# Patient Record
Sex: MALE | Race: OTHER | NOT HISPANIC OR LATINO | URBAN - METROPOLITAN AREA
[De-identification: names, ages, dates, MRNs, and addresses within clinical notes are randomized per-mention and may not be internally consistent; named-entity substitution may affect disease eponyms.]

---

## 2024-03-22 ENCOUNTER — EMERGENCY (EMERGENCY)
Facility: HOSPITAL | Age: 19
LOS: 1 days | Discharge: HOME | End: 2024-03-22
Attending: EMERGENCY MEDICINE
Payer: COMMERCIAL

## 2024-03-22 VITALS
HEART RATE: 60 BPM | TEMPERATURE: 98 F | SYSTOLIC BLOOD PRESSURE: 130 MMHG | RESPIRATION RATE: 16 BRPM | OXYGEN SATURATION: 99 % | DIASTOLIC BLOOD PRESSURE: 80 MMHG

## 2024-03-22 VITALS
HEART RATE: 74 BPM | HEIGHT: 74 IN | SYSTOLIC BLOOD PRESSURE: 160 MMHG | DIASTOLIC BLOOD PRESSURE: 67 MMHG | WEIGHT: 195.11 LBS | RESPIRATION RATE: 18 BRPM | TEMPERATURE: 98 F | OXYGEN SATURATION: 99 %

## 2024-03-22 PROCEDURE — 99283 EMERGENCY DEPT VISIT LOW MDM: CPT | Mod: 25

## 2024-03-22 PROCEDURE — 99284 EMERGENCY DEPT VISIT MOD MDM: CPT

## 2024-03-22 PROCEDURE — 71046 X-RAY EXAM CHEST 2 VIEWS: CPT | Mod: 26

## 2024-03-22 PROCEDURE — 71046 X-RAY EXAM CHEST 2 VIEWS: CPT

## 2024-03-22 PROCEDURE — 93005 ELECTROCARDIOGRAM TRACING: CPT

## 2024-03-22 RX ORDER — IBUPROFEN 200 MG
600 TABLET ORAL ONCE
Refills: 0 | Status: COMPLETED | OUTPATIENT
Start: 2024-03-22 | End: 2024-03-22

## 2024-03-22 NOTE — ED PROVIDER NOTE - PATIENT PORTAL LINK FT
You can access the FollowMyHealth Patient Portal offered by [facility] by registering at the following website: [email or web address]. By joining [facility]’s FollowMyHealth portal, you will also be able to view your health information using other applications (apps) compatible with our system.

## 2024-03-22 NOTE — ED PROVIDER NOTE - OBJECTIVE STATEMENT
18-year-old male with no past medical history presents with reports that last week he had cough, fever and chills.  The last 2 days he has been having difficulty breathing, right-sided chest pain and the feeling as if he needs to clear his throat.  Patient denies fever, cough, sore throat, ear pain at this time.  Patient is worried that there may be a problem with his lungs.

## 2024-03-22 NOTE — ED ADULT NURSE NOTE - HIV OFFER
Problem: Patient/Family Goals  Goal: Patient/Family Long Term Goal  Patient's Long Term Goal: To go home    Interventions:  - Nausea and vomiting under control.  - Blood work and electrolytes normalized.   - See additional Care Plan goals for specific inter preferences  - Enhance eating environment   Outcome: Progressing  Clear liquid diet. Opt out

## 2024-03-22 NOTE — ED PROVIDER NOTE - NSFOLLOWUPINSTRUCTIONS_ED_ALL_ED_FT
Follow up with your primary care doctor within 1 week     You can take motrin/tylenol as needed for pain.    If you experience any new or worsening symptoms or if you are concerned you can always come back to the emergency for a re-evaluation.

## 2024-03-22 NOTE — ED PROVIDER NOTE - CARDIAC, MLM
Normal rate, regular rhythm.  Heart sounds S1, S2.  No murmurs, rubs or gallops. +reproducible chest pain with palpation

## 2024-03-22 NOTE — ED PROVIDER NOTE - ATTENDING APP SHARED VISIT CONTRIBUTION OF CARE
18-year-old male with no past medical history presents with reports that last week he had cough, fever and chills.  The last 2 days he has been having difficulty breathing, right-sided chest pain and the feeling as if he needs to clear his throat.  Patient denies fever, cough, sore throat, ear pain at this time.  Patient is worried that there may be a problem with his lungs.    Chest pain is reproducible on exam. Consistent with costochondritis. No concerns for ACS. Lung sounds clear. patient requesting CXR. Will obtain.

## 2024-09-14 ENCOUNTER — EMERGENCY (EMERGENCY)
Facility: HOSPITAL | Age: 19
LOS: 1 days | Discharge: HOME | End: 2024-09-14
Attending: STUDENT IN AN ORGANIZED HEALTH CARE EDUCATION/TRAINING PROGRAM
Payer: COMMERCIAL

## 2024-09-14 VITALS
SYSTOLIC BLOOD PRESSURE: 116 MMHG | HEART RATE: 100 BPM | TEMPERATURE: 98 F | RESPIRATION RATE: 18 BRPM | DIASTOLIC BLOOD PRESSURE: 75 MMHG | OXYGEN SATURATION: 100 % | HEIGHT: 74 IN | WEIGHT: 216.05 LBS

## 2024-09-14 LAB
ANION GAP SERPL CALC-SCNC: 7 MMOL/L — SIGNIFICANT CHANGE UP (ref 5–17)
BUN SERPL-MCNC: 17 MG/DL — SIGNIFICANT CHANGE UP (ref 7–18)
CALCIUM SERPL-MCNC: 9.1 MG/DL — SIGNIFICANT CHANGE UP (ref 8.4–10.5)
CHLORIDE SERPL-SCNC: 108 MMOL/L — SIGNIFICANT CHANGE UP (ref 96–108)
CO2 SERPL-SCNC: 22 MMOL/L — SIGNIFICANT CHANGE UP (ref 22–31)
CREAT SERPL-MCNC: 1.23 MG/DL — SIGNIFICANT CHANGE UP (ref 0.5–1.3)
EGFR: 87 ML/MIN/1.73M2 — SIGNIFICANT CHANGE UP
GLUCOSE SERPL-MCNC: 127 MG/DL — HIGH (ref 70–99)
HCT VFR BLD CALC: 42.2 % — SIGNIFICANT CHANGE UP (ref 39–50)
HGB BLD-MCNC: 15.7 G/DL — SIGNIFICANT CHANGE UP (ref 13–17)
MCHC RBC-ENTMCNC: 30.7 PG — SIGNIFICANT CHANGE UP (ref 27–34)
MCHC RBC-ENTMCNC: 36.9 GM/DL — HIGH (ref 32–36)
MCV RBC AUTO: 83.3 FL — SIGNIFICANT CHANGE UP (ref 80–100)
NRBC # BLD: 0 /100 WBCS — SIGNIFICANT CHANGE UP (ref 0–0)
PLATELET # BLD AUTO: 280 K/UL — SIGNIFICANT CHANGE UP (ref 150–400)
POTASSIUM SERPL-MCNC: 3.5 MMOL/L — SIGNIFICANT CHANGE UP (ref 3.5–5.3)
POTASSIUM SERPL-SCNC: 3.5 MMOL/L — SIGNIFICANT CHANGE UP (ref 3.5–5.3)
RBC # BLD: 3.48 M/UL — LOW (ref 4.2–5.8)
RBC # FLD: 11.7 % — SIGNIFICANT CHANGE UP (ref 10.3–14.5)
SODIUM SERPL-SCNC: 137 MMOL/L — SIGNIFICANT CHANGE UP (ref 135–145)
WBC # BLD: 6.25 K/UL — SIGNIFICANT CHANGE UP (ref 3.8–10.5)
WBC # FLD AUTO: 6.25 K/UL — SIGNIFICANT CHANGE UP (ref 3.8–10.5)

## 2024-09-14 PROCEDURE — 99285 EMERGENCY DEPT VISIT HI MDM: CPT | Mod: 25

## 2024-09-14 PROCEDURE — 36415 COLL VENOUS BLD VENIPUNCTURE: CPT

## 2024-09-14 PROCEDURE — 93005 ELECTROCARDIOGRAM TRACING: CPT

## 2024-09-14 PROCEDURE — 99284 EMERGENCY DEPT VISIT MOD MDM: CPT

## 2024-09-14 PROCEDURE — 85027 COMPLETE CBC AUTOMATED: CPT

## 2024-09-14 PROCEDURE — 80048 BASIC METABOLIC PNL TOTAL CA: CPT

## 2024-09-14 PROCEDURE — 93010 ELECTROCARDIOGRAM REPORT: CPT

## 2024-09-14 PROCEDURE — 71045 X-RAY EXAM CHEST 1 VIEW: CPT

## 2024-09-14 PROCEDURE — 71045 X-RAY EXAM CHEST 1 VIEW: CPT | Mod: 26

## 2024-09-14 RX ORDER — IBUPROFEN 600 MG
600 TABLET ORAL ONCE
Refills: 0 | Status: COMPLETED | OUTPATIENT
Start: 2024-09-14 | End: 2024-09-14

## 2024-09-14 RX ADMIN — Medication 600 MILLIGRAM(S): at 22:43

## 2024-09-14 NOTE — ED ADULT TRIAGE NOTE - SOURCE OF INFORMATION
PATIENT HAD ONE OF THOSE AT HOME COVID TEST AND CAME BACK POSITIVE. SYMPTOMS IS ONLY A TICKLE IN CHEST AND LOW GRADE FEVER. PATIENT WAS REQUESTING DR REEVES TO PUT AN ORDER FOR COVID ANTIBODIES INFUSION THERAPY    PLEASE ADVISE    [phone]   Patient

## 2024-09-14 NOTE — ED PROVIDER NOTE - CLINICAL SUMMARY MEDICAL DECISION MAKING FREE TEXT BOX
This patient presents with symptoms consistent with acute anxiety reaction / panic attack. Low suspicion for acute cardiopulmonary process including ACS, PE, or thoracic aortic dissection. Denies any ingestions or any other medical complaints. No evidence of alcohol withdrawal symptoms. Given history and physical presentation not consistent with overt toxidrome, ingestion. Presentation not consistent with a medical emergency at this time. No acute indication for psychiatric consultation (without SI/HI, AH/VH). Cautious return precautions discussed with full understanding.

## 2024-09-14 NOTE — ED PROVIDER NOTE - NSFOLLOWUPINSTRUCTIONS_ED_ALL_ED_FT
During a panic attack, you may have a feeling of intense fear or terror, trouble breathing, chest pain or tightness, heartbeat changes, dizziness, sweating, and shaking. A panic attack starts suddenly and usually lasts from 5 to 20 minutes but may last even longer. An attack can begin with a stressful event. Or it can happen without a cause.    Although panic attacks can cause scary symptoms, you can learn to manage them with self-care, counselling, and medicine    Engage your mind. Get out and do something you enjoy. Go to a [employer] movie, or take a walk or hike. Plan your day. Having too much or too little to do can make you anxious.    Keep a record of your symptoms. Discuss your fears with a good friend or family member, or join a support group for people with similar problems. Talking to others sometimes relieves stress.    Get involved in social groups, or volunteer to help others. Being alone sometimes makes things seem worse than they are.    Get at least 2½ hours of moderate to vigorous exercise a week to relieve stress. Walking is a good choice. You also may want to do other activities, such as running, swimming, cycling, or playing tennis or team sports.

## 2024-09-14 NOTE — ED ADULT TRIAGE NOTE - CHIEF COMPLAINT QUOTE
I can't breath and I had numbness on my face and my body feels cramping and I have dry phlegm  happened 40 minutes ago while at work

## 2024-09-14 NOTE — ED PROVIDER NOTE - PATIENT PORTAL LINK FT
You can access the FollowMyHealth Patient Portal offered by [facility] by registering at the following website: [email or web address]. By joining [facility]’s FollowMyHealth portal, you will also be able to view your health information using other applications (apps) compatible with our system.

## 2024-09-14 NOTE — ED PROVIDER NOTE - OBJECTIVE STATEMENT
18-year-old male presents to the ED for sudden onset shortness of breath.  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  Reports that when the sensation happened he felt his fingers nicolás facial muscles twitching.  Patient reports that he has been smoking cannabis and has a large amount of stress due to work.  Denies any use of cocaine or amphetamines.    GENERAL APPEARANCE:  AAOx3, generally well-appearing, no acute distress. Appears stated age.  HEENT:  NCAT. Moist mucous membranes. EOMI, clear conjunctiva, oropharynx clear.  NECK:  Supple without lymphadenopathy. No JVD.  No neck stiffness or restricted ROM.  HEART:  Normal heart rate and regular rhythm. No murmur.   LUNGS:  CTAB, moving air well. No crackles or wheezes are heard.  ABDOMEN:  Soft, nontender, non-distended. Negative Coughlin. Negative McBurney. No rebound or guarding.  CHEST/BACK: Chest wall non-tender. No CVAT, or midline cervical/thoracic/lumbar tenderness to palpation. No obvious deformity of chest wall or back.  EXTREMITIES:  Without cyanosis, clubbing or edema. Pulse intact x 4. FROM x4. Compartments soft in all extremities.  NEUROLOGICAL:  Grossly non-focal. Alert and oriented, moving all 4 extremities. CN II-XII grossly intact. Observed to ambulate with normal gait.  SKIN:  Warm and dry without any rash.  PSYCH: Calm, cooperative. Normal mood and affect. Demonstrates proper insight and judgement.

## 2024-09-20 ENCOUNTER — EMERGENCY (EMERGENCY)
Facility: HOSPITAL | Age: 19
LOS: 1 days | Discharge: HOME | End: 2024-09-20
Attending: EMERGENCY MEDICINE
Payer: MEDICAID

## 2024-09-20 VITALS
DIASTOLIC BLOOD PRESSURE: 81 MMHG | WEIGHT: 214.95 LBS | HEIGHT: 74 IN | TEMPERATURE: 98 F | RESPIRATION RATE: 16 BRPM | HEART RATE: 92 BPM | SYSTOLIC BLOOD PRESSURE: 142 MMHG | OXYGEN SATURATION: 98 %

## 2024-09-20 LAB
ALBUMIN SERPL ELPH-MCNC: 4.6 G/DL — SIGNIFICANT CHANGE UP (ref 3.5–5)
ALP SERPL-CCNC: 84 U/L — SIGNIFICANT CHANGE UP (ref 40–120)
ALT FLD-CCNC: 21 U/L DA — SIGNIFICANT CHANGE UP (ref 10–60)
ANION GAP SERPL CALC-SCNC: 8 MMOL/L — SIGNIFICANT CHANGE UP (ref 5–17)
AST SERPL-CCNC: 15 U/L — SIGNIFICANT CHANGE UP (ref 10–40)
BASOPHILS # BLD AUTO: 0.04 K/UL — SIGNIFICANT CHANGE UP (ref 0–0.2)
BASOPHILS NFR BLD AUTO: 0.4 % — SIGNIFICANT CHANGE UP (ref 0–2)
BILIRUB SERPL-MCNC: 0.4 MG/DL — SIGNIFICANT CHANGE UP (ref 0.2–1.2)
BUN SERPL-MCNC: 21 MG/DL — HIGH (ref 7–18)
CALCIUM SERPL-MCNC: 9.5 MG/DL — SIGNIFICANT CHANGE UP (ref 8.4–10.5)
CHLORIDE SERPL-SCNC: 109 MMOL/L — HIGH (ref 96–108)
CO2 SERPL-SCNC: 20 MMOL/L — LOW (ref 22–31)
CREAT SERPL-MCNC: 1.12 MG/DL — SIGNIFICANT CHANGE UP (ref 0.5–1.3)
EGFR: 97 ML/MIN/1.73M2 — SIGNIFICANT CHANGE UP
EOSINOPHIL # BLD AUTO: 0.06 K/UL — SIGNIFICANT CHANGE UP (ref 0–0.5)
EOSINOPHIL NFR BLD AUTO: 0.6 % — SIGNIFICANT CHANGE UP (ref 0–6)
GLUCOSE SERPL-MCNC: 110 MG/DL — HIGH (ref 70–99)
HCT VFR BLD CALC: 44.7 % — SIGNIFICANT CHANGE UP (ref 39–50)
HGB BLD-MCNC: 16.3 G/DL — SIGNIFICANT CHANGE UP (ref 13–17)
IMM GRANULOCYTES NFR BLD AUTO: 0.2 % — SIGNIFICANT CHANGE UP (ref 0–0.9)
LIDOCAIN IGE QN: 28 U/L — SIGNIFICANT CHANGE UP (ref 13–75)
LYMPHOCYTES # BLD AUTO: 1.35 K/UL — SIGNIFICANT CHANGE UP (ref 1–3.3)
LYMPHOCYTES # BLD AUTO: 14.5 % — SIGNIFICANT CHANGE UP (ref 13–44)
MCHC RBC-ENTMCNC: 30.5 PG — SIGNIFICANT CHANGE UP (ref 27–34)
MCHC RBC-ENTMCNC: 36.5 GM/DL — HIGH (ref 32–36)
MCV RBC AUTO: 83.6 FL — SIGNIFICANT CHANGE UP (ref 80–100)
MONOCYTES # BLD AUTO: 0.76 K/UL — SIGNIFICANT CHANGE UP (ref 0–0.9)
MONOCYTES NFR BLD AUTO: 8.1 % — SIGNIFICANT CHANGE UP (ref 2–14)
NEUTROPHILS # BLD AUTO: 7.11 K/UL — SIGNIFICANT CHANGE UP (ref 1.8–7.4)
NEUTROPHILS NFR BLD AUTO: 76.2 % — SIGNIFICANT CHANGE UP (ref 43–77)
NRBC # BLD: 0 /100 WBCS — SIGNIFICANT CHANGE UP (ref 0–0)
PLATELET # BLD AUTO: 311 K/UL — SIGNIFICANT CHANGE UP (ref 150–400)
POTASSIUM SERPL-MCNC: 3.6 MMOL/L — SIGNIFICANT CHANGE UP (ref 3.5–5.3)
POTASSIUM SERPL-SCNC: 3.6 MMOL/L — SIGNIFICANT CHANGE UP (ref 3.5–5.3)
PROT SERPL-MCNC: 8.4 G/DL — HIGH (ref 6–8.3)
RBC # BLD: 5.35 M/UL — SIGNIFICANT CHANGE UP (ref 4.2–5.8)
RBC # FLD: 11.8 % — SIGNIFICANT CHANGE UP (ref 10.3–14.5)
SODIUM SERPL-SCNC: 137 MMOL/L — SIGNIFICANT CHANGE UP (ref 135–145)
WBC # BLD: 9.34 K/UL — SIGNIFICANT CHANGE UP (ref 3.8–10.5)
WBC # FLD AUTO: 9.34 K/UL — SIGNIFICANT CHANGE UP (ref 3.8–10.5)

## 2024-09-20 PROCEDURE — 80053 COMPREHEN METABOLIC PANEL: CPT

## 2024-09-20 PROCEDURE — 99283 EMERGENCY DEPT VISIT LOW MDM: CPT | Mod: 25

## 2024-09-20 PROCEDURE — 99284 EMERGENCY DEPT VISIT MOD MDM: CPT

## 2024-09-20 PROCEDURE — 36415 COLL VENOUS BLD VENIPUNCTURE: CPT

## 2024-09-20 PROCEDURE — 85025 COMPLETE CBC W/AUTO DIFF WBC: CPT

## 2024-09-20 PROCEDURE — 83690 ASSAY OF LIPASE: CPT

## 2024-09-20 PROCEDURE — 71046 X-RAY EXAM CHEST 2 VIEWS: CPT

## 2024-09-20 PROCEDURE — 71046 X-RAY EXAM CHEST 2 VIEWS: CPT | Mod: 26

## 2024-09-20 PROCEDURE — 96360 HYDRATION IV INFUSION INIT: CPT

## 2024-09-20 RX ORDER — SODIUM CHLORIDE 9 MG/ML
1000 INJECTION INTRAMUSCULAR; INTRAVENOUS; SUBCUTANEOUS ONCE
Refills: 0 | Status: COMPLETED | OUTPATIENT
Start: 2024-09-20 | End: 2024-09-20

## 2024-09-20 RX ADMIN — SODIUM CHLORIDE 1000 MILLILITER(S): 9 INJECTION INTRAMUSCULAR; INTRAVENOUS; SUBCUTANEOUS at 23:03

## 2024-09-20 NOTE — ED ADULT TRIAGE NOTE - CHIEF COMPLAINT QUOTE
BIBA  for  c/o  food  stuck on his  throat  ,  feels  like  he  is  not getting  enough air. no  drooling  and  able to speak in fu;; sentences.

## 2024-09-20 NOTE — ED ADULT NURSE NOTE - OBJECTIVE STATEMENT
patient c/o getting food stuck in his nasal passages x today at dinner. Endorses SOB, SpO2 99% on Room Air. Denies chest pain, dizziness, headache, nausea and vomiting.

## 2024-09-21 VITALS
RESPIRATION RATE: 16 BRPM | TEMPERATURE: 98 F | OXYGEN SATURATION: 100 % | DIASTOLIC BLOOD PRESSURE: 97 MMHG | SYSTOLIC BLOOD PRESSURE: 156 MMHG | HEART RATE: 98 BPM

## 2024-09-21 PROBLEM — Z78.9 OTHER SPECIFIED HEALTH STATUS: Chronic | Status: ACTIVE | Noted: 2024-09-14

## 2024-09-21 RX ADMIN — SODIUM CHLORIDE 1000 MILLILITER(S): 9 INJECTION INTRAMUSCULAR; INTRAVENOUS; SUBCUTANEOUS at 00:02

## 2024-09-21 NOTE — ED PROVIDER NOTE - CLINICAL SUMMARY MEDICAL DECISION MAKING FREE TEXT BOX
18-year-old male well-appearing complains of "food stuck in his throat" and " forward struck in my chest ".  Appears anxious.  Normal heart and lung exam.  Patient insistent on chest x-ray.  Will perform lab work and chest x-ray and reassess.

## 2024-09-21 NOTE — ED PROVIDER NOTE - NSFOLLOWUPINSTRUCTIONS_ED_ALL_ED_FT
Anxiety in Adolescents    WHAT YOU NEED TO KNOW:    What do I need to know about anxiety? Anxiety is a condition that causes you to feel extremely worried or nervous. The feelings are so strong that they can cause problems with your daily activities or sleep. Anxiety may be triggered by something you fear, such as something happening to a parent or friend. Anxiety may happen without a cause. You may feel anxiety only at certain times, such as before you give a presentation in school. Anxiety can become a long-term condition if it is not managed or treated.    What increases my risk for anxiety?    Stress at home, school, or work, or in a relationship    Use of caffeine, nicotine products, alcohol, or illegal drugs    Certain medicines or health conditions    Changes in your body or emotions from puberty    Not feeling accepted for the way you look, think, or act  What other common signs and symptoms may occur with anxiety?    Stomach pain, headaches, or pain in your arms or back    Flushed skin or sweating    Muscle tightness, cramping, or trembling    Shaking, or feeling restless or irritable    Problems focusing    Trouble sleeping or nightmares    Feeling jumpy, easily startled, or dizzy    Rapid heartbeat or shortness of breath  How is anxiety diagnosed? Tell your healthcare provider when your symptoms began, what triggers them, and if anxiety affects your daily activities. Your provider may ask about your past or present drug, alcohol, or nicotine use. It may be hard to talk about these. Honest answers can help your provider understand what triggers your anxiety and anything you do to control it.    How is anxiety treated? Healthcare providers will treat any medical condition that may be causing your symptoms. Any of the following may also help:    Medicines may help you feel more calm and relaxed, and decrease your symptoms. Medicines are usually used along with therapy.    Cognitive behavior therapy can help you find ways to feel less anxious. A therapist can help you learn to control how your body responds to anxiety. The therapist may also teach you ways to relax muscles and slow breathing when you feel anxious.    Counseling is talk therapy. You can talk about your anxiety and other feelings with a counselor.  What can I do to manage anxiety in a healthy way?    Talk with someone about your anxiety. You can talk through situations or events that make you feel anxious, such as giving a speech. Talk with someone you trust and feel comfortable with, such as a friend, sibling, or teacher. Choose someone you know will listen to you and offer support and encouragement.    Do activities you enjoy. Spend time with friends, or do something fun. Choose activities you are familiar with or comfortable doing. This may help prevent anxiety.    Practice deep breathing. Deep breathing can help you relax when you are anxious. Focus on taking slow, deep breaths several times a day, or during an anxiety attack. Breathe in through your nose and out through your mouth. Deep breathing combined with meditation or listening to music may help you feel calmer.    Create a regular sleep routine. Regular sleep can help you feel calmer during the day. Go to sleep and wake up at the same times every day. Do not watch television or use the computer right before bed. Your room should be comfortable, dark, and quiet.    Eat a variety of healthy foods. Healthy foods include fruits, vegetables, low-fat dairy products, lean meats, fish, whole-grain breads, and cooked beans. Healthy foods can help you feel less anxious and have more energy. Do not have foods or drinks that are meant to increase your energy level or that contain caffeine. Caffeine can make your symptoms worse.  Healthy Foods      Be physically active throughout the day. Physical activity, such as exercise, can increase your energy level. Exercise may also lift your mood and help you sleep better. Your healthcare provider can help you create an exercise plan.   Family Walking for Exercise      Do not use tobacco products, alcohol, or illegal drugs. Alcohol, illegal drugs, and nicotine in tobacco products can all increase anxiety or make it hard to manage. Talk to your healthcare provider if you use any of these and need help to quit. E-cigarettes and smokeless tobacco still contain nicotine. Talk to your healthcare provider before you use these products.  Call your local emergency number (911 in the [place]) or have someone call if:    You have chest pain, tightness, or heaviness that may spread to your shoulders, arms, jaw, neck, or back.    You feel like hurting yourself or someone else.  When should I call my doctor?    Your symptoms get worse or do not get better with treatment.    Your anxiety keeps you from doing your regular daily activities.    You have new symptoms since your last visit.    You have questions or concerns about your condition or care.  CARE AGREEMENT:    You have the right to help plan your care. Learn about your health condition and how it may be treated. Discuss treatment options with your healthcare providers to decide what care you want to receive. You always have the right to refuse treatment.    © [employer] 1973, 2024

## 2024-09-21 NOTE — ED PROVIDER NOTE - OBJECTIVE STATEMENT
18-year-old male denies past medical history presenting with several weeks of feeling food getting stuck in his lungs and in his throat.  States that he cannot breathe.  States that he is feels he is not getting enough oxygen.  States that he has multiple visits to the ED for similar most recently 5 days ago.  States he was told that he has anxiety.  Denies any fever, cough, chest pain, leg pain or recent travel.

## 2024-09-21 NOTE — ED PROVIDER NOTE - PROGRESS NOTE DETAILS
Patient reassessed states he is feeling better and would like to go home.  Patient's mother arrived and reports that there is a family history of depression/anxiety and that she suspects that he may have anxiety.  Discussed follow-up with specialist to evaluate for any acute medical issues and to incorporate PCP in patient evaluation.

## 2024-11-20 NOTE — ED ADULT NURSE NOTE - NSFALLRISKASMT_ED_ALL_ED_DT
Render Risk Assessment In Note?: no Detail Level: Simple Comment: Pt is experiencing itching, burning, and pain sensations in selected areas. Sensation is consistent throughout the year. No physical appearance of rash, no results with wood lamp. Pt states history of nerve related issues after chemo treatment. Discussed possible damage to nerves due to chemo or pinched nerves due to age.\\nRecommended pt to apply topical lidocaine, Aspercreme, or icy hot. Pt states he has Gabapentin at home, advised pt to take. If no improvement seen with neuropathy treatment may need to reevaluate and advise ot to see oncologist to evaluate lymph nodes. Lymph nodes feel normal. Comment: Patient has history of colon cancer with chemo and surgery\\nWith chemo he had significant neurological symptoms with difficulty getting up.  2 years after he finished his treatments symptoms improved.  Now he has burning sensations without rash in both axilla and groin.  He has gabapentin at home, Recommend restarting a trial to see if this helps his symptoms\\Cruz discussed topical lidocaine otc\\nIf not improving recommend seeing his oncologist to evaluate lymph nodes.  I feel a few small <1cm rubbery nodes. 20-Sep-2024 21:46